# Patient Record
Sex: FEMALE | Race: AMERICAN INDIAN OR ALASKA NATIVE
[De-identification: names, ages, dates, MRNs, and addresses within clinical notes are randomized per-mention and may not be internally consistent; named-entity substitution may affect disease eponyms.]

---

## 2018-05-24 ENCOUNTER — HOSPITAL ENCOUNTER (EMERGENCY)
Dept: HOSPITAL 5 - ED | Age: 28
LOS: 1 days | Discharge: HOME | End: 2018-05-25
Payer: MEDICAID

## 2018-05-24 VITALS — DIASTOLIC BLOOD PRESSURE: 87 MMHG | SYSTOLIC BLOOD PRESSURE: 137 MMHG

## 2018-05-24 DIAGNOSIS — K05.10: ICD-10-CM

## 2018-05-24 DIAGNOSIS — S39.012A: Primary | ICD-10-CM

## 2018-05-24 DIAGNOSIS — K02.9: ICD-10-CM

## 2018-05-24 DIAGNOSIS — E21.3: ICD-10-CM

## 2018-05-24 DIAGNOSIS — I10: ICD-10-CM

## 2018-05-24 PROCEDURE — 81001 URINALYSIS AUTO W/SCOPE: CPT

## 2018-05-24 PROCEDURE — 99282 EMERGENCY DEPT VISIT SF MDM: CPT

## 2018-05-24 PROCEDURE — 96372 THER/PROPH/DIAG INJ SC/IM: CPT

## 2018-05-24 PROCEDURE — 81025 URINE PREGNANCY TEST: CPT

## 2018-05-25 LAB
BACTERIA #/AREA URNS HPF: (no result) /HPF
BILIRUB UR QL STRIP: (no result)
BLOOD UR QL VISUAL: (no result)
MUCOUS THREADS #/AREA URNS HPF: (no result) /HPF
PH UR STRIP: 6 [PH] (ref 5–7)
PROT UR STRIP-MCNC: (no result) MG/DL
RBC #/AREA URNS HPF: 5 /HPF (ref 0–6)
UROBILINOGEN UR-MCNC: < 2 MG/DL (ref ?–2)
WBC #/AREA URNS HPF: 32 /HPF (ref 0–6)

## 2018-05-25 NOTE — EMERGENCY DEPARTMENT REPORT
ED Back Pain/Injury HPI





- General


Chief Complaint: Dental/Oral


Stated Complaint: BACK PAIN, TOOTHACHE


Time Seen by Provider: 18 00:13


Source: patient


Limitations: No Limitations





- History of Present Illness


Initial Comments: 





This is a 27-year-old female nontoxic, well nourished in appearance, no acute 

signs of distress presents to the ED with c/o of acute on chronic lower back 

pain x1 week and left upper toothache x6 months.  Patient stated that the past 

2 days she was moving and developed this pain.  Patient states has history of 

sciatica nerve pain which is similar symptoms as today.  Patient states that 

pain radiates through to his left lower extremity. Patient denies any facial 

swelling.   Patient denies any trauma.  Denies any bladder or bowel 

instability.  Denies any fever, chills, nausea, abdominal pain, vomiting, 

headache, stiff neck, chest pain or shortness of breath.  Patient denies any 

urinary symptoms.  Patient denies any numbness or tingling.  Denies any 

allergies.  PMH includes HTN.


MD Complaint: back pain


-: week(s) (1)


Similar Symptoms Previously: Yes


Place: home


Radiation: left leg


Severity: mild


Severity scale (0 -10): 8


Quality: aching


Consistency: intermittent


Improves With: immobilization, supine, sitting upright


Worsens With: movement


Context: while lifting, turning/twisting


Associated Symptoms: denies other symptoms.  denies: confusion, weakness, chest 

pain, numbness, difficulty walking, cough, difficulty urinating, diaphoresis, 

incontinence, fever/chills, constipation, headaches, abdominal pain, loss of 

appetite, malaise, nausea/vomiting, rash, seizure, shortness of breath, syncope





- Related Data


 Previous Rx's











 Medication  Instructions  Recorded  Last Taken  Type


 


Amoxicillin/K Clav Tab [Augmentin 1 tab PO Q12HR #20 tab 18 Unknown Rx





875 mg]    


 


Cyclobenzaprine [Flexeril] 10 mg PO QHS PRN #7 tablet 18 Unknown Rx


 


Ibuprofen [Motrin] 600 mg PO Q8H PRN #30 tablet 18 Unknown Rx











 Allergies











Allergy/AdvReac Type Severity Reaction Status Date / Time


 


No Known Allergies Allergy   Unverified 16 16:01














ED Review of Systems


ROS: 


Stated complaint: BACK PAIN, TOOTHACHE


Other details as noted in HPI





Constitutional: denies: chills, fever


Eyes: denies: eye pain, eye discharge, vision change


ENT: dental pain.  denies: ear pain, throat pain


Respiratory: denies: cough, shortness of breath, wheezing


Cardiovascular: denies: chest pain, palpitations


Endocrine: no symptoms reported


Gastrointestinal: denies: abdominal pain, nausea, diarrhea


Genitourinary: denies: urgency, dysuria, discharge


Musculoskeletal: back pain.  denies: joint swelling, arthralgia


Skin: denies: rash, lesions


Neurological: denies: headache, weakness, paresthesias


Psychiatric: denies: anxiety, depression


Hematological/Lymphatic: denies: easy bleeding, easy bruising





ED Past Medical Hx





- Past Medical History


Previous Medical History?: Yes


Hx Hypertension: Yes


Hx Congestive Heart Failure: No


Hx Diabetes: No


Hx Deep Vein Thrombosis: No


Hx Renal Disease: Yes (kidney stones)


Hx Seizures: Yes (childhood last age 21)


Hx Asthma: No


Hx COPD: No


Additional medical history: History of cholelithiasis, hyperparathyroidism





- Surgical History


Past Surgical History?: Yes


Additional Surgical History:  





- Social History


Smoking Status: Never Smoker


Substance Use Type: None





- Medications


Home Medications: 


 Home Medications











 Medication  Instructions  Recorded  Confirmed  Last Taken  Type


 


Amoxicillin/K Clav Tab [Augmentin 1 tab PO Q12HR #20 tab 18  Unknown Rx





875 mg]     


 


Cyclobenzaprine [Flexeril] 10 mg PO QHS PRN #7 tablet 18  Unknown Rx


 


Ibuprofen [Motrin] 600 mg PO Q8H PRN #30 tablet 18  Unknown Rx














ED Physical Exam





- General


Limitations: No Limitations


General appearance: alert, in no apparent distress





- Head


Head exam: Present: atraumatic, normocephalic





- Eye


Eye exam: Present: normal appearance





- ENT


ENT exam: Present: mucous membranes moist, TM's normal bilaterally, normal 

external ear exam





- Expanded ENT Exam


  ** Expanded


Ear exam: Present: normal external inspection


Mouth exam: Present: normal external inspection, tongue normal.  Absent: 

drooling, trismus, muffled voice, tongue elevation, laceration


Teeth exam: Present: dental caries, fractured tooth #, dental tenderness #, 

gingival enlargement, other (No facial swelling)





  __________________________














  __________________________





 1 - Fractured, Dental Tenderness





Throat exam: Positive: normal inspection, other (Uvula midline. No abscess.).  

Negative: tonsillar erythema, tonsillomegaly, tonsillar exudate, R 

peritonsillar mass, L peritonsillar mass





- Neck


Neck exam: Present: normal inspection, full ROM.  Absent: tenderness, 

meningismus, lymphadenopathy





- Respiratory


Respiratory exam: Present: normal lung sounds bilaterally.  Absent: respiratory 

distress, wheezes, rales, rhonchi, stridor, chest wall tenderness, accessory 

muscle use, decreased breath sounds, prolonged expiratory





- Cardiovascular


Cardiovascular Exam: Present: regular rate, normal rhythm, normal heart sounds.

  Absent: irregular rhythm, systolic murmur, diastolic murmur, rubs, gallop





- GI/Abdominal


GI/Abdominal exam: Present: soft, normal bowel sounds





- Extremities Exam


Extremities exam: Present: normal inspection, full ROM, normal capillary refill





- Back Exam


Back exam: Present: normal inspection, full ROM, paraspinal tenderness (lumbar 

region).  Absent: tenderness, CVA tenderness (R), CVA tenderness (L), muscle 

spasm, vertebral tenderness, rash noted





- Expanded Back Exam


  ** Expanded


Back exam: Absent: saddle anesthesia


Back exam: Negative Straight Leg Raising: Left, Right





- Neurological Exam


Neurological exam: Present: alert, oriented X3, normal gait





- Psychiatric


Psychiatric exam: Present: normal affect, normal mood





- Skin


Skin exam: Present: warm, dry, intact, normal color.  Absent: rash





ED Course





 Vital Signs











  18





  22:22


 


Temperature 98.1 F


 


Pulse Rate 113 H


 


Respiratory 18





Rate 


 


Blood Pressure 137/87


 


O2 Sat by Pulse 100





Oximetry 














- Reevaluation(s)


Reevaluation #1: 





18 00:45


Patient is speaking in full sentences with no signs of distress noted.





ED Medical Decision Making





- Medical Decision Making





This is a 27-year-old female that presents with low back strain, dental caries, 

and gingivitis.  Patient is stable was examined by me. No facial swelling. No 

abscess.  There is no spinal tenderness.  There is no cauda equina syndrome 

during examination.  No bladder or bowel instability. Patient received Toradol 

60 mg IM in the ED which preceded his symptoms has resolved and subsided.  

Patient is discharged with muscle relaxant and Motrin.  Patient was instructed 

not to operate any machinery while taking muscle relaxant as they cause her 

drowsiness.  Patient was referred to Follow-up with a primary care doctor in 3-

5 days or if symptoms worsen and continue return to emergency room as soon as 

possible.  At time of discharge, the patient does not seem toxic or ill in 

appearance.  No acute signs of distress noted.  Patient agrees to discharge 

treatment plan of care.  No further questions noted by the patient.





This chart is dictated with using Dragon Dictation Program


Critical care attestation.: 


If time is entered above; I have spent that time in minutes in the direct care 

of this critically ill patient, excluding procedure time.








ED Disposition


Clinical Impression: 


 Dental caries, Gingivitis





Low back strain


Qualifiers:


 Encounter type: initial encounter Qualified Code(s): S39.012A - Strain of 

muscle, fascia and tendon of lower back, initial encounter





Disposition:  TO HOME OR SELFCARE


Is pt being admited?: No


Does the pt Need Aspirin: No


Condition: Stable


Instructions:  Gingivitis (ED), Dental Caries (ED), Low Back Strain (ED), 

Cyclobenzaprine (By mouth), Ibuprofen (By mouth), Amoxicillin/Clavulanate 

Potassium (By mouth)


Additional Instructions: 


Follow-up with a primary care and dentist doctor in 3-5 days or if symptoms 

worsen and continue return to emergency room as soon as possible. 


Prescriptions: 


Cyclobenzaprine [Flexeril] 10 mg PO QHS PRN #7 tablet


 PRN Reason: Muscle Spasm


Amoxicillin/K Clav Tab [Augmentin 875 mg] 1 tab PO Q12HR #20 tab


Ibuprofen [Motrin] 600 mg PO Q8H PRN #30 tablet


 PRN Reason: Pain


Referrals: 


PRIMARY CARE,MD [Primary Care Provider] - 3-5 Days


JAYDEN MEADE MD [Staff Physician] - 3-5 Days


[facility] [Outside] - 3-5 Days


[facility] [Outside] - 3-5 Days


Forms:  Work/School Release Form(ED)

## 2018-09-22 ENCOUNTER — HOSPITAL ENCOUNTER (EMERGENCY)
Dept: HOSPITAL 5 - ED | Age: 28
Discharge: HOME | End: 2018-09-22
Payer: MEDICAID

## 2018-09-22 VITALS — DIASTOLIC BLOOD PRESSURE: 103 MMHG | SYSTOLIC BLOOD PRESSURE: 149 MMHG

## 2018-09-22 DIAGNOSIS — E21.3: ICD-10-CM

## 2018-09-22 DIAGNOSIS — I10: ICD-10-CM

## 2018-09-22 DIAGNOSIS — Z87.891: ICD-10-CM

## 2018-09-22 DIAGNOSIS — L05.01: Primary | ICD-10-CM

## 2018-09-22 LAB
BACTERIA #/AREA URNS HPF: (no result) /HPF
BASOPHILS # (AUTO): 0 K/MM3 (ref 0–0.1)
BASOPHILS NFR BLD AUTO: 0.4 % (ref 0–1.8)
BILIRUB UR QL STRIP: (no result)
BLOOD UR QL VISUAL: (no result)
BUN SERPL-MCNC: 14 MG/DL (ref 7–17)
BUN/CREAT SERPL: 14 %
CALCIUM SERPL-MCNC: 9.1 MG/DL (ref 8.4–10.2)
EOSINOPHIL # BLD AUTO: 0 K/MM3 (ref 0–0.4)
EOSINOPHIL NFR BLD AUTO: 0.6 % (ref 0–4.3)
HCT VFR BLD CALC: 35.7 % (ref 30.3–42.9)
HEMOLYSIS INDEX: 0
HGB BLD-MCNC: 11.7 GM/DL (ref 10.1–14.3)
LYMPHOCYTES # BLD AUTO: 1.9 K/MM3 (ref 1.2–5.4)
LYMPHOCYTES NFR BLD AUTO: 25.3 % (ref 13.4–35)
MCH RBC QN AUTO: 27 PG (ref 28–32)
MCHC RBC AUTO-ENTMCNC: 33 % (ref 30–34)
MCV RBC AUTO: 82 FL (ref 79–97)
MONOCYTES # (AUTO): 0.6 K/MM3 (ref 0–0.8)
MONOCYTES % (AUTO): 8 % (ref 0–7.3)
MUCOUS THREADS #/AREA URNS HPF: (no result) /HPF
PH UR STRIP: 5 [PH] (ref 5–7)
PLATELET # BLD: 221 K/MM3 (ref 140–440)
RBC # BLD AUTO: 4.35 M/MM3 (ref 3.65–5.03)
RBC #/AREA URNS HPF: 2 /HPF (ref 0–6)
UROBILINOGEN UR-MCNC: < 2 MG/DL (ref ?–2)
WBC #/AREA URNS HPF: 4 /HPF (ref 0–6)

## 2018-09-22 PROCEDURE — 80048 BASIC METABOLIC PNL TOTAL CA: CPT

## 2018-09-22 PROCEDURE — 81025 URINE PREGNANCY TEST: CPT

## 2018-09-22 PROCEDURE — 81001 URINALYSIS AUTO W/SCOPE: CPT

## 2018-09-22 PROCEDURE — 36415 COLL VENOUS BLD VENIPUNCTURE: CPT

## 2018-09-22 PROCEDURE — 85025 COMPLETE CBC W/AUTO DIFF WBC: CPT

## 2018-09-22 NOTE — EMERGENCY DEPARTMENT REPORT
Abscess Boil HPI





- HPI


Chief Complaint: Rectal Pain


Stated Complaint: CYST ON TALE BONE/FEVER PAIN


Time Seen by Provider: 18 09:14


Duration: 3 Days


Location: Sacral/Pilonidal


Severity: Moderate


History: Yes Pain, No Fever, No Purulent Drainage, No Numbness, No Foreign Body

, No Previous History, No Insect Bite


Home Medications: 


 Previous Rx's











 Medication  Instructions  Recorded  Last Taken  Type


 


Ibuprofen [Motrin] 600 mg PO Q8H PRN #30 tablet 18 Unknown Rx


 


cephALEXin [Keflex] 500 mg PO Q12HR #20 cap 18 Unknown Rx


 


traMADol [Ultram] 50 mg PO Q6HR PRN #12 tablet 18 Unknown Rx











Allergies/Adverse Reactions: 


 Allergies











Allergy/AdvReac Type Severity Reaction Status Date / Time


 


No Known Allergies Allergy   Unverified 16 16:01














ED Review of Systems


ROS: 


Stated complaint: CYST ON TALE BONE/FEVER PAIN


Other details as noted in HPI





Comment: All other systems reviewed and negative


Constitutional: denies: chills, fever


Respiratory: no symptoms reported


Cardiovascular: denies: chest pain, palpitations


Genitourinary: denies: urgency, dysuria


Musculoskeletal: denies: back pain


Skin: lesions, other (pilonila cyst)


Neurological: denies: headache, weakness


Psychiatric: denies: anxiety, depression


Hematological/Lymphatic: denies: easy bleeding





ED Past Medical Hx





- Past Medical History


Hx Hypertension: Yes


Hx Congestive Heart Failure: No


Hx Diabetes: No


Hx Deep Vein Thrombosis: No


Hx Renal Disease: Yes (kidney stones)


Hx Seizures: Yes (childhood last age 21)


Hx Asthma: No


Hx COPD: No


Additional medical history: History of cholelithiasis, hyperparathyroidism





- Surgical History


Additional Surgical History:  





- Social History


Smoking Status: Former Smoker





- Medications


Home Medications: 


 Home Medications











 Medication  Instructions  Recorded  Confirmed  Last Taken  Type


 


Ibuprofen [Motrin] 600 mg PO Q8H PRN #30 tablet 18  Unknown Rx


 


cephALEXin [Keflex] 500 mg PO Q12HR #20 cap 18  Unknown Rx


 


traMADol [Ultram] 50 mg PO Q6HR PRN #12 tablet 18  Unknown Rx














ED Abscess Boil Physical Exam





- Exam


General: 


Vital signs noted. No distress. Alert and acting appropriately.





Front/Back of Body, Lg (Color): 


  __________________________














  __________________________





 1 - area of cyst





Size: 4 cm


Exam: Yes Tenderness, Yes Fluctuance, Yes Surrounding Cellulites/Erythema, Yes 

Normal Neurologic Exam, Yes Normal Circulation, No Lymphangitis, No Crepitation

, No Heart Murmur





I & D Note





- I & D Note


I & D Note: 2% lido 5 ml.  area cleaned with 1/2 ns and 1/2 betadine.  area 

numbed and cleaned.  no 10 blade to open 1/4 inch area.  expressed mod amount 

purulent drainage.  tracking across midline noted.  loculations opened.  

packing w iodoform guaze.  dressing applied.  dc instructions reviewed.  pt 

tolerated well





ED Course


 Vital Signs











  18





  08:48


 


Temperature 97.5 F L


 


Pulse Rate 88


 


Respiratory 18





Rate 


 


Blood Pressure 149/103














- Reevaluation(s)


Reevaluation #1: 





18 11:04


medicated





labs noted





Reevaluation #2: 





18 11:04


i/d performed tolerated well


Critical care attestation.: 


If time is entered above; I have spent that time in minutes in the direct care 

of this critically ill patient, excluding procedure time.








ED Medical Decision Making





- Lab Data


Result diagrams: 


 18 09:35





 18 09:35





- Medical Decision Making





non toxic


i/d


packed


antibiotics given area





- Differential Diagnosis


pilon. cyst v simple abscess





ED Disposition


Clinical Impression: 


 Pilonidal abscess





Disposition: DC-01 TO HOME OR SELFCARE


Is pt being admited?: No


Does the pt Need Aspirin: No


Condition: Stable


Instructions:  Anorectal Abscess and Anal Fistula (ED)


Additional Instructions: 


soak in epsom salts 3 times per day for 20 minutes





apply guaze dressing





meds as ordered





call pcp on monday for gen surgery follow up





do not be alarmed if packing falls out





tell pcp the packing is in and she may want to see you








Prescriptions: 


cephALEXin [Keflex] 500 mg PO Q12HR #20 cap


traMADol [Ultram] 50 mg PO Q6HR PRN #12 tablet


 PRN Reason: Pain


Referrals: 


PRIMARY CARE,MD [Primary Care Provider] - 3-5 Days


Time of Disposition: 11:00

## 2019-02-25 ENCOUNTER — HOSPITAL ENCOUNTER (EMERGENCY)
Dept: HOSPITAL 5 - ED | Age: 29
LOS: 1 days | Discharge: HOME | End: 2019-02-26
Payer: MEDICAID

## 2019-02-25 DIAGNOSIS — B34.9: Primary | ICD-10-CM

## 2019-02-25 DIAGNOSIS — I10: ICD-10-CM

## 2019-02-25 PROCEDURE — 71046 X-RAY EXAM CHEST 2 VIEWS: CPT

## 2019-02-25 NOTE — XRAY REPORT
FINAL REPORT



PROCEDURE:  XR CHEST ROUTINE 2V



TECHNIQUE:  PA and lateral chest radiographs were obtained. CPT 70978







HISTORY:  cough 



COMPARISON:  No prior studies are available for comparison.



FINDINGS:  

Heart: Normal.



Mediastinum/Vessels: Normal.



Lungs/Pleural space: Normal.



Bony thorax: Mild degree dextroscoliosis is noted.



Other: 



IMPRESSION:  

Normal examination.

## 2019-02-25 NOTE — EMERGENCY DEPARTMENT REPORT
Blank Doc





- Documentation


Documentation: 





This is a 28-year-old female that presents with URI symptoms and a cyst in the 

back.





This initial assessment diagnostic orders/clinical plan/treatment(s) is/are 

subject to change based on patient's health status, clinical progression and re-

assessment by fellow clinical providers in the ED.  Further treatment and workup

at subsequent clinical providers discretion.  Patient/guardians urged not to 

elope from ED s their condition may be serious if not clinically assessed and 

managed.  Initial orders include:


1-Patient sent to [facility] for further evaluation and treatment


2- CXR

## 2019-02-25 NOTE — EMERGENCY DEPARTMENT REPORT
- General


Chief Complaint: Upper Respiratory Infection


Stated Complaint: COUGH/BODY PAIN/CYST


Time Seen by Provider: 19 18:22


Source: patient


Mode of arrival: Ambulatory


Limitations: No Limitations





- History of Present Illness


Initial Comments: 





28-year-old -American female presents to the emergency room for flulike 

symptoms.  Patient reports that she also has mild tenderness and firmness to her

gluteal cleft where she had a cyst drained in the past.  She denies any fever 

but does complain of cough runny nose and nasal congestion.  Denies any fever no

chills no nausea no vomiting or abdominal pain.


MD Complaint: cough, sore throat, rhinorrhea, nasal congestion, other (this on 

gluteal cleft)


-: days(s) (1)


Improves With: nothing


Associated Symptoms: myalgias, rhinorrhea, nasal congestion, cough.  denies: 

fever, chills, headache, nausea, vomiting, diarrhea


Treatments Prior to Arrival: none





- Related Data


                                  Previous Rx's











 Medication  Instructions  Recorded  Last Taken  Type


 


cephALEXin [Keflex] 500 mg PO Q12HR #20 cap 18 Unknown Rx


 


Benzonatate [Tessalon Perle] 100 mg PO TID PRN 7 Days #21 19 Unknown Rx





 capsule   


 


Ibuprofen [Motrin 600 MG tab] 600 mg PO Q8H PRN #30 tablet 19 Unknown Rx


 


Oseltamivir Phosphate [Tamiflu] 75 mg PO BID 5 Days #10 capsule 19 Unknown

 Rx


 


Sulfamethoxazole/Trimethoprim 1 each PO BID #20 tablet 19 Unknown Rx





[Bactrim DS TAB]    


 


traMADol [Ultram 50 MG tab] 50 mg PO Q6HR PRN #12 tablet 19 Unknown Rx











                                    Allergies











Allergy/AdvReac Type Severity Reaction Status Date / Time


 


No Known Allergies Allergy   Unverified 16 16:01














ED Review of Systems


ROS: 


Stated complaint: COUGH/BODY PAIN/CYST


Other details as noted in HPI





Comment: All other systems reviewed and negative


Constitutional: denies: chills, fever


Eyes: denies: eye pain, eye discharge, vision change


ENT: throat pain, congestion.  denies: ear pain


Respiratory: cough


Cardiovascular: denies: chest pain, palpitations


Endocrine: no symptoms reported


Gastrointestinal: denies: abdominal pain, nausea, diarrhea


Genitourinary: denies: urgency, dysuria, discharge


Musculoskeletal: denies: back pain, joint swelling, arthralgia


Skin: denies: rash, lesions


Neurological: denies: headache, weakness, paresthesias





ED Past Medical Hx





- Past Medical History


Hx Hypertension: Yes


Hx Congestive Heart Failure: No


Hx Diabetes: No


Hx Deep Vein Thrombosis: No


Hx Renal Disease: Yes (kidney stones)


Hx Seizures: Yes (childhood last age 21)


Hx Asthma: No


Hx COPD: No


Additional medical history: History of cholelithiasis, hyperparathyroidism





- Surgical History


Additional Surgical History:  , T&A,thyroid





- Social History


Smoking Status: Never Smoker


Substance Use Type: None





- Medications


Home Medications: 


                                Home Medications











 Medication  Instructions  Recorded  Confirmed  Last Taken  Type


 


cephALEXin [Keflex] 500 mg PO Q12HR #20 cap 18  Unknown Rx


 


Benzonatate [Tessalon Perle] 100 mg PO TID PRN 7 Days #21 19  Unknown Rx





 capsule    


 


Ibuprofen [Motrin 600 MG tab] 600 mg PO Q8H PRN #30 tablet 19  Unknown Rx


 


Oseltamivir Phosphate [Tamiflu] 75 mg PO BID 5 Days #10 capsule 19  

Unknown Rx


 


Sulfamethoxazole/Trimethoprim 1 each PO BID #20 tablet 19  Unknown Rx





[Bactrim DS TAB]     


 


traMADol [Ultram 50 MG tab] 50 mg PO Q6HR PRN #12 tablet 19  Unknown Rx














ED Physical Exam





- General


Limitations: No Limitations


General appearance: alert, in no apparent distress





- Head


Head exam: Present: atraumatic, normocephalic





- Eye


Eye exam: Present: EOMI





- ENT


ENT exam: Present: mucous membranes moist





- Neck


Neck exam: Present: normal inspection, full ROM.  Absent: tenderness, 

lymphadenopathy





- Respiratory


Respiratory exam: Present: normal lung sounds bilaterally.  Absent: respiratory 

distress





- Cardiovascular


Cardiovascular Exam: Present: regular rate, normal rhythm.  Absent: systolic 

murmur, diastolic murmur, rubs, gallop





- GI/Abdominal


GI/Abdominal exam: Present: soft, normal bowel sounds





- Back Exam


Back exam: Present: normal inspection





- Neurological Exam


Neurological exam: Present: alert, oriented X3





- Psychiatric


Psychiatric exam: Present: normal affect, normal mood





- Expanded Skin Exam


  ** Expanded


Description of rash: Present: tenderness, indurated.  Absent: erythematous, swel

ling, discharge, fluctuant





ED Course





                                   Vital Signs











  02/25/19





  18:25


 


Temperature 98.4 F


 


Pulse Rate 99 H


 


Respiratory 16





Rate 


 


Blood Pressure 146/98


 


O2 Sat by Pulse 99





Oximetry 














ED Medical Decision Making





- Medical Decision Making





Patient has been evaluated by this provider in the Acc.


Discussed with patient place her on antibiotics or her cysts.


Patient was started on Tamiflu for flulike symptoms, Tessalon Perles for cough 

and ibuprofen for body aches and cyst pain.


Discussed patient to use warm compresses to this and to return if it increases 

in pain diameter and intensity.


Patient verbalizes understanding





Critical care attestation.: 


If time is entered above; I have spent that time in minutes in the direct care 

of this critically ill patient, excluding procedure time.








ED Disposition


Clinical Impression: 


 Viral syndrome, H/O pilonidal cyst





Disposition: - TO HOME OR SELFCARE


Is pt being admited?: No


Does the pt Need Aspirin: No


Condition: Stable


Instructions:  Viral Syndrome (ED)


Additional Instructions: 


Complete medication as prescribed.  Increase her water intake.  Follow-up with 

your primary care provider if her symptoms persist or gets worse.


Prescriptions: 


Benzonatate [Tessalon Perle] 100 mg PO TID PRN 7 Days #21 capsule


 PRN Reason: Cough


Ibuprofen [Motrin 600 MG tab] 600 mg PO Q8H PRN #30 tablet


 PRN Reason: Pain


Oseltamivir Phosphate [Tamiflu] 75 mg PO BID 5 Days #10 capsule


Sulfamethoxazole/Trimethoprim [Bactrim DS TAB] 1 each PO BID #20 tablet


traMADol [Ultram 50 MG tab] 50 mg PO Q6HR PRN #12 tablet


 PRN Reason: Pain


Referrals: 


ELOY CROUCH MD [Primary Care Provider] - 3-5 Days


Forms:  Work/School Release Form(ED)

## 2019-02-26 VITALS — SYSTOLIC BLOOD PRESSURE: 140 MMHG | DIASTOLIC BLOOD PRESSURE: 102 MMHG

## 2021-02-17 ENCOUNTER — OFFICE VISIT (OUTPATIENT)
Dept: URBAN - METROPOLITAN AREA CLINIC 25 | Facility: CLINIC | Age: 31
End: 2021-02-17

## 2021-03-04 ENCOUNTER — OFFICE VISIT (OUTPATIENT)
Dept: URBAN - METROPOLITAN AREA CLINIC 25 | Facility: CLINIC | Age: 31
End: 2021-03-04

## 2021-09-26 ENCOUNTER — HOSPITAL ENCOUNTER (EMERGENCY)
Dept: HOSPITAL 5 - ED | Age: 31
LOS: 1 days | Discharge: HOME | End: 2021-09-27
Payer: COMMERCIAL

## 2021-09-26 DIAGNOSIS — L03.317: ICD-10-CM

## 2021-09-26 DIAGNOSIS — L02.31: Primary | ICD-10-CM

## 2021-09-26 DIAGNOSIS — L05.01: ICD-10-CM

## 2021-09-26 DIAGNOSIS — I10: ICD-10-CM

## 2021-09-26 PROCEDURE — 99282 EMERGENCY DEPT VISIT SF MDM: CPT

## 2021-09-27 VITALS — DIASTOLIC BLOOD PRESSURE: 102 MMHG | SYSTOLIC BLOOD PRESSURE: 147 MMHG

## 2021-09-27 NOTE — EMERGENCY DEPARTMENT REPORT
ED General Adult HPI





- General


Chief complaint: Abdominal Pain


Stated complaint: ABD PAIN


Source: patient


Mode of arrival: Ambulatory


Limitations: No Limitations





- History of Present Illness


Initial comments: 





Patient is a 31-year-old -American female with past medical history of 

kidney stones, hypertension, hyperparathyroidism and seizures who presents to 

the ED with complaint of acute onset persistent severe painful swollen mild 

erythematous maculopapular rash on left gluteal cleft for the last 1 week.  

Patient states that the pain has worsened especially in the last 3 days.  

Patient states that tonight she was not able to sleep because of worsening pain.

 Patient also complains of painful swollen left inguinal lymph nodes for the 

last 2 days.  Patient denies abdominal pain, nausea, vomiting, fever, chills, 

cough, chest pain, traumatic injury, numbness and tingling or weakness of lower 

extremities bilaterally, bowel incontinence, low back pain, urinary retention or

saddle paresthesia.


MD Complaint: Painful swollen erythematous maculopapular rash on left gluteal 

cleft


-: Sudden, week(s) (1)


Location: buttocks (LEFT)


Radiation: non-radiation


Severity scale (0 -10): 9


Quality: aching, sharp, constant


Consistency: constant


Improves with: none


Worsens with: movement


Associated Symptoms: denies other symptoms, loss of appetite, malaise, rash 

(Painful, swollen, mildly erythematous maculopapular rash on left gluteal 

cleft).  denies: confusion, chest pain, cough, diaphoresis, fever/chills, 

headaches, nausea/vomiting, seizure, shortness of breath, syncope, weakness


Treatments Prior to Arrival: NSAID





- Related Data


                                  Previous Rx's











 Medication  Instructions  Recorded  Last Taken  Type


 


cephALEXin [Keflex] 500 mg PO Q12HR #20 cap 18 Unknown Rx


 


Benzonatate [Tessalon Perle] 100 mg PO TID PRN 7 Days #21 19 Unknown Rx





 capsule   


 


Ibuprofen [Motrin 600 MG tab] 600 mg PO Q8H PRN #30 tablet 19 Unknown Rx


 


Oseltamivir Phosphate [Tamiflu] 75 mg PO BID 5 Days #10 capsule 19 Unknown

 Rx


 


traMADoL [Ultram 50 MG tab] 50 mg PO Q6HR PRN #12 tablet 19 Unknown Rx


 


Cyclobenzaprine [Flexeril] 10 mg PO TID PRN #30 tablet 19 Unknown Rx


 


Diclofenac  (Nf) 50 mg PO TID PRN #30 tablet. 19 Unknown Rx


 


Menthol/Camphor [Tiger Balm 1 applicatio TP TID PRN #1 tube 19 Unknown Rx





Ointment]    


 


predniSONE [Deltasone] 40 mg PO QDAY 5 Days #10 tab 19 Unknown Rx


 


Acetaminophen/Codeine [Tylenol 1 tab PO Q6H PRN #12 tab 21 Unknown Rx





/Codeine # 3 tab]    


 


Ibuprofen [Motrin] 800 mg PO Q8HR PRN #30 tablet 21 Unknown Rx


 


Sulfamethoxazole/Trimethoprim 1 each PO Q12H #20 tablet 21 Unknown Rx





[Bactrim DS TAB]    











                                    Allergies











Allergy/AdvReac Type Severity Reaction Status Date / Time


 


No Known Allergies Allergy   Verified 19 19:41














ED Review of Systems


ROS: 


Stated complaint: ABD PAIN


Other details as noted in HPI





Constitutional: denies: chills, fever


Eyes: denies: eye pain, eye discharge, vision change


ENT: denies: ear pain, throat pain


Respiratory: denies: cough, shortness of breath, wheezing


Cardiovascular: denies: chest pain, palpitations


Endocrine: no symptoms reported


Gastrointestinal: denies: abdominal pain, nausea, diarrhea


Genitourinary: denies: urgency, dysuria, discharge


Musculoskeletal: denies: back pain, joint swelling, arthralgia


Skin: rash (Painful, swollen, mildly erythematous maculopapular rash on left 

gluteal cleft), change in color.  denies: lesions


Neurological: denies: headache, weakness, paresthesias


Psychiatric: denies: anxiety, depression


Hematological/Lymphatic: denies: easy bleeding, easy bruising





ED Past Medical Hx





- Past Medical History


Hx Hypertension: Yes


Hx Congestive Heart Failure: No


Hx Diabetes: No


Hx Deep Vein Thrombosis: No


Hx Renal Disease: Yes (kidney stones)


Hx Seizures: Yes (childhood last age 21)


Hx Asthma: No


Hx COPD: No


Additional medical history: History of cholelithiasis, hyperparathyroidism





- Surgical History


Additional Surgical History:  , T&A,thyroid





- Social History


Smoking Status: Never Smoker


Substance Use Type: None





- Medications


Home Medications: 


                                Home Medications











 Medication  Instructions  Recorded  Confirmed  Last Taken  Type


 


cephALEXin [Keflex] 500 mg PO Q12HR #20 cap 18  Unknown Rx


 


Benzonatate [Tessalon Perle] 100 mg PO TID PRN 7 Days #21 19  Unknown Rx





 capsule    


 


Ibuprofen [Motrin 600 MG tab] 600 mg PO Q8H PRN #30 tablet 19  Unknown Rx


 


Oseltamivir Phosphate [Tamiflu] 75 mg PO BID 5 Days #10 capsule 19  

Unknown Rx


 


traMADoL [Ultram 50 MG tab] 50 mg PO Q6HR PRN #12 tablet 19  Unknown Rx


 


Cyclobenzaprine [Flexeril] 10 mg PO TID PRN #30 tablet 19  Unknown Rx


 


Diclofenac Dr (Nf) 50 mg PO TID PRN #30 tablet.dr 19  Unknown Rx


 


Menthol/Camphor [Tiger Balm 1 applicatio TP TID PRN #1 tube 19  Unknown Rx





Ointment]     


 


predniSONE [Deltasone] 40 mg PO QDAY 5 Days #10 tab 19  Unknown Rx


 


Acetaminophen/Codeine [Tylenol 1 tab PO Q6H PRN #12 tab 21  Unknown Rx





/Codeine # 3 tab]     


 


Ibuprofen [Motrin] 800 mg PO Q8HR PRN #30 tablet 21  Unknown Rx


 


Sulfamethoxazole/Trimethoprim 1 each PO Q12H #20 tablet 21  Unknown Rx





[Bactrim DS TAB]     














ED Physical Exam





- General


Limitations: No Limitations


General appearance: alert, in no apparent distress





- Head


Head exam: Present: atraumatic, normocephalic, normal inspection





- Eye


Eye exam: Present: normal appearance, PERRL, EOMI


Pupils: Present: normal accommodation





- ENT


ENT exam: Present: normal exam, normal orophraynx, mucous membranes moist, TM's 

normal bilaterally, normal external ear exam





- Neck


Neck exam: Present: normal inspection, full ROM





- Respiratory


Respiratory exam: Present: normal lung sounds bilaterally.  Absent: respiratory 

distress, wheezes, rales, stridor, chest wall tenderness, accessory muscle use, 

decreased breath sounds, prolonged expiratory





- Cardiovascular


Cardiovascular Exam: Present: normal rhythm, tachycardia, normal heart sounds.  

Absent: systolic murmur, diastolic murmur, rubs, gallop





- GI/Abdominal


GI/Abdominal exam: Present: soft, normal bowel sounds.  Absent: tenderness, 

guarding, rebound, hyperactive bowel sounds, hypoactive bowel sounds, 

organomegaly, mass, pulsatile mass, hernia





- Extremities Exam


Extremities exam: Present: normal inspection, full ROM, normal capillary refill





- Back Exam


Back exam: Present: normal inspection, full ROM.  Absent: tenderness, CVA 

tenderness (R), CVA tenderness (L), muscle spasm, paraspinal tenderness





- Neurological Exam


Neurological exam: Present: alert, oriented X3, CN II-XII intact, normal gait, 

reflexes normal





- Psychiatric


Psychiatric exam: Present: normal affect, normal mood





- Skin


Skin exam: Present: warm, dry, intact, normal color, rash (Swollen, severely 

tender, fluctuant maculopapular erythematous rash on left gluteal cleft), er

ythema, other (Female RN chaperone Ms. Williamson present during the physical exam.)





ED Course


                                   Vital Signs











  21





  01:27


 


Temperature 98.9 F


 


Pulse Rate 114 H


 


Respiratory 18





Rate 


 


Blood Pressure 147/102


 


O2 Sat by Pulse 100





Oximetry 














- I & D


  ** Left Buttocks


Type of Procedure: Simple


Site: Left gluteal cleft


Blade Size: 11


I & D Procedure: betadine prep, sterile drapes applied, sterile dressing 

applied, gauze wick placed


Progress: 





The left gluteal cleft was cleaned thoroughly with normal saline and Betadine 

solution.  Lidocaine 1% solution was infiltrated around the rash for local 

anesthesia.  When anesthesia was fully achieved, an incision was made on the 

rash and copious thick purulent malodorous discharge drained from the wound.  

The wound was then debrided extensively with normal saline and flocculation's 

were broken with hemostat.  The wound was then packed with iodoform quarter inch

gauze and dressed appropriately with 4 x 4 gauzes and Tegaderm.  Patient 

tolerated the procedure well.  Patient was theN discharged home on pain 

medications and antibiotics and advised to follow-up with her primary care 

physician in 7 to 10 days for reevaluation or return to the ED immediately if 

symptoms get worse.  Patient was also advised to return to the ED in 2 days for 

wound recheck and packing removal.





ED Medical Decision Making





- Medical Decision Making





This is a 31-year-old -American female with past medical history of 

kidney stones, hypertension, hyperparathyroidism and seizures who presents to 

the ED with complaint of acute onset persistent severe painful swollen mild 

erythematous maculopapular rash on left gluteal cleft for the last 1 week.  

Patient states that the pain has worsened especially in the last 3 days.  

Patient states that tonight she was not able to sleep because of worsening pain.

 Patient also complains of painful swollen left inguinal lymph nodes for the 

last 2 days.  In the ED, patient is alert and oriented x3 and is not in any 

distress but appears to be in significant pain crying during the physical exam. 

Patient was treated for pain in the ED and also given initial oral antibiotics. 

The left gluteal abscess was incised and drained per protocol after the area was

extensively cleaned with Betadine and normal saline and 1% lidocaine solution 

was used as a local anesthetic.  The wound was then incised with scalpel blade 

#11 and copious thick purulent greenish malodorous discharge drained from the 

wound.  The wound was extensively debrided with normal saline and for locu

lations were broken with hemostat.  The wound was then packed with iodoform 

quarter inch gauze and dressed appropriately with 4 x 4 gauze and Tegaderm.  

Patient tolerated the procedure well.  On reevaluation, patient is alert and 

oriented x3, is hemodynamically stable, the patient pain is well controlled with

medication, and patient is fully ambulatory in the ED with no difficulties.  

Patient was discharged home and advised to return to the ED in 2 days for 

recheck or packing removal.  Patient was otherwise advised to follow-up with her

primary care physician in 7 to 10 days for reevaluation or return to the ED 

immediately if symptoms get worse.





- Differential Diagnosis


Cutaneous abscess; cellulitis; folliculitis; pilonidal cyst abscess


Critical care attestation.: 


If time is entered above; I have spent that time in minutes in the direct care 

of this critically ill patient, excluding procedure time.








ED Disposition


Clinical Impression: 


 Abscess and cellulitis of gluteal region, Sacrococcygeal pilonidal cyst with 

abscess





Disposition:  HOME / SELF CARE / HOMELESS


Is pt being admited?: No


Does the pt Need Aspirin: No


Condition: Stable


Instructions:  Abdominal Pain (ED), Incision and Drainage, Care After, 

Cellulitis, Adult, Easy-to-Read, Skin Abscess, Easy-to-Read, Pilonidal Cyst 

Drainage


Additional Instructions: 


Take medication with food, drink plenty of fluids and follow-up with your 

primary care physician in 7 to 10 days for reevaluation.  Return to the ED 

immediately if symptoms get worse.  Otherwise return to the ED in 2 days for 

wound recheck and packing removal.


Prescriptions: 


Sulfamethoxazole/Trimethoprim [Bactrim DS TAB] 1 each PO Q12H #20 tablet


Ibuprofen [Motrin] 800 mg PO Q8HR PRN #30 tablet


 PRN Reason: Pain , Severe (7-10)


Acetaminophen/Codeine [Tylenol /Codeine # 3 tab] 1 tab PO Q6H PRN #12 tab


 PRN Reason: Pain , Severe (7-10)


Referrals: 


[facility] [Provider Group] - 7-10 days


Forms:  Work/School Release Form(ED)


Time of Disposition: 05:14


Print Language: ENGLISH

## 2021-09-28 ENCOUNTER — HOSPITAL ENCOUNTER (EMERGENCY)
Dept: HOSPITAL 5 - ED | Age: 31
LOS: 1 days | Discharge: HOME | End: 2021-09-29
Payer: COMMERCIAL

## 2021-09-28 DIAGNOSIS — K80.20: ICD-10-CM

## 2021-09-28 DIAGNOSIS — Z48.01: Primary | ICD-10-CM

## 2021-09-28 DIAGNOSIS — L02.31: ICD-10-CM

## 2021-09-28 DIAGNOSIS — E21.3: ICD-10-CM

## 2021-09-28 DIAGNOSIS — Z98.890: ICD-10-CM

## 2021-09-28 DIAGNOSIS — L03.317: ICD-10-CM

## 2021-09-28 DIAGNOSIS — G43.909: ICD-10-CM

## 2021-09-28 DIAGNOSIS — N18.9: ICD-10-CM

## 2021-09-28 DIAGNOSIS — I12.9: ICD-10-CM

## 2021-09-28 DIAGNOSIS — N20.0: ICD-10-CM

## 2021-09-28 PROCEDURE — 99282 EMERGENCY DEPT VISIT SF MDM: CPT

## 2021-09-29 VITALS — SYSTOLIC BLOOD PRESSURE: 149 MMHG | DIASTOLIC BLOOD PRESSURE: 95 MMHG

## 2021-09-29 NOTE — EMERGENCY DEPARTMENT REPORT
ED General Adult HPI





- General


Chief complaint: Laceration/Recheck/Suture


Stated complaint: PACKAGING REMOVAL


Time Seen by Provider: 21 00:38


Source: patient


Mode of arrival: Ambulatory


Limitations: No Limitations





- History of Present Illness


Initial comments: 


31-year-old female patient presents to the emergency department for packing 

removal.  Patient states she underwent incision and drainage of a gluteal 

abscess 2 days ago.  She has been taking antibiotics and pain medication as 

prescribed.  States her pain is not adequately controlled on her current 

medication regimen.  She is not currently scheduled for outpatient follow-up.  

Patient has suffered from multiple abscesses in the gluteal area.  She was 

evaluated by general surgery on a prior occasion but no operation was performed.

 Patient is otherwise asymptomatic without complaints.





- Related Data


                                  Previous Rx's











 Medication  Instructions  Recorded  Last Taken  Type


 


cephALEXin [Keflex] 500 mg PO Q12HR #20 cap 18 Unknown Rx


 


Benzonatate [Tessalon Perle] 100 mg PO TID PRN 7 Days #21 19 Unknown Rx





 capsule   


 


Ibuprofen [Motrin 600 MG tab] 600 mg PO Q8H PRN #30 tablet 19 Unknown Rx


 


Oseltamivir Phosphate [Tamiflu] 75 mg PO BID 5 Days #10 capsule 19 Unknown

 Rx


 


traMADoL [Ultram 50 MG tab] 50 mg PO Q6HR PRN #12 tablet 19 Unknown Rx


 


Cyclobenzaprine [Flexeril] 10 mg PO TID PRN #30 tablet 19 Unknown Rx


 


Diclofenac Dr (Nf) 50 mg PO TID PRN #30 tablet.dr 19 Unknown Rx


 


Menthol/Camphor [Tiger Balm 1 applicatio TP TID PRN #1 tube 19 Unknown Rx





Ointment]    


 


predniSONE [Deltasone] 40 mg PO QDAY 5 Days #10 tab 19 Unknown Rx


 


Acetaminophen/Codeine [Tylenol 1 tab PO Q6H PRN #12 tab 21 Unknown Rx





/Codeine # 3 tab]    


 


Ibuprofen [Motrin] 800 mg PO Q8HR PRN #30 tablet 21 Unknown Rx


 


Sulfamethoxazole/Trimethoprim 1 each PO Q12H #20 tablet 21 Unknown Rx





[Bactrim DS TAB]    


 


traMADoL [Ultram 50 MG tab] 50 mg PO Q6HR PRN #10 tablet 21 Unknown Rx











                                    Allergies











Allergy/AdvReac Type Severity Reaction Status Date / Time


 


No Known Allergies Allergy   Verified 21 00:36














ED Review of Systems


ROS: 


Stated complaint: PACKAGING REMOVAL


Other details as noted in HPI





Other: 





GENERAL: Negative for fever. 


CARDIOVASCULAR: Negative for chest pain. 


PULMONARY: Negative for shortness of breath. 


GASTROINTESTINAL: Negative for abdominal pain. 


MUSCULOSKELETAL: Negative for back pain. 


NEUROLOGICAL: Negative for headache. 


INTEGUMENTARY: Positive for abscess.





ED Past Medical Hx





- Past Medical History


Hx Hypertension: Yes


Hx Congestive Heart Failure: No


Hx Diabetes: No


Hx Deep Vein Thrombosis: No


Hx Renal Disease: Yes (kidney stones)


Hx Seizures: Yes (childhood last age 21)


Hx Asthma: No


Hx COPD: No


Additional medical history: History of cholelithiasis, hyperparathyroidism





- Surgical History


Additional Surgical History:  , T&A,thyroid





- Social History


Smoking Status: Never Smoker


Substance Use Type: None





- Medications


Home Medications: 


                                Home Medications











 Medication  Instructions  Recorded  Confirmed  Last Taken  Type


 


cephALEXin [Keflex] 500 mg PO Q12HR #20 cap 18  Unknown Rx


 


Benzonatate [Tessalon Perle] 100 mg PO TID PRN 7 Days #21 19  Unknown Rx





 capsule    


 


Ibuprofen [Motrin 600 MG tab] 600 mg PO Q8H PRN #30 tablet 19  Unknown Rx


 


Oseltamivir Phosphate [Tamiflu] 75 mg PO BID 5 Days #10 capsule 19  

Unknown Rx


 


traMADoL [Ultram 50 MG tab] 50 mg PO Q6HR PRN #12 tablet 19  Unknown Rx


 


Cyclobenzaprine [Flexeril] 10 mg PO TID PRN #30 tablet 19  Unknown Rx


 


Diclofenac Dr (Nf) 50 mg PO TID PRN #30 tablet.dr 19  Unknown Rx


 


Menthol/Camphor [Tiger Balm 1 applicatio TP TID PRN #1 tube 19  Unknown Rx





Ointment]     


 


predniSONE [Deltasone] 40 mg PO QDAY 5 Days #10 tab 19  Unknown Rx


 


Acetaminophen/Codeine [Tylenol 1 tab PO Q6H PRN #12 tab 21  Unknown Rx





/Codeine # 3 tab]     


 


Ibuprofen [Motrin] 800 mg PO Q8HR PRN #30 tablet 21  Unknown Rx


 


Sulfamethoxazole/Trimethoprim 1 each PO Q12H #20 tablet 21  Unknown Rx





[Bactrim DS TAB]     


 


traMADoL [Ultram 50 MG tab] 50 mg PO Q6HR PRN #10 tablet 21  Unknown Rx














ED Physical Exam





- General


Limitations: No Limitations





- Other


Other exam information: 





General: Awake, appropriately interactive, no acute distress. 


Neck: Supple. Full range of motion intact. 


Cardiovascular: Normal peripheral perfusion. 


Pulmonary: No respiratory distress. Patient is speaking normally without use of 

accessory muscles.


Skin: Abscess status post I&D to the medial aspect of the left gluteus with 

iodoform packing in place. 


Neurological: No facial asymmetry. Speech is clear. Follows commands. Patient is

alert and oriented. 


Musculoskeletal: Moves all four extremities spontaneously with normal range of 

motion. 


Psych: Cooperative. Appropriate mood and affect.





ED Course


                                   Vital Signs











  21





  00:36


 


Temperature 99.4 F


 


Pulse Rate 86


 


Respiratory 24





Rate 


 


Blood Pressure 149/95


 


O2 Sat by Pulse 97





Oximetry 














- Procedure Description


Procedures done: Verbal consent was obtained from the patient.  The site was 

identified.  The area was prepped and draped.  Hand hygiene was observed.  

Tegaderm dressing was removed.  Several layers of gauze with residual drainage 

were removed.  Packing was removed using forceps.  Clean dressing applied post 

procedure.  Patient tolerated well without complications.





ED Medical Decision Making





- Medical Decision Making


Patient presents to the emergency department for removal of packing 2 days 

status post I&D.  Packing removed without complications.  See procedure note for

details.  Patient has been compliant with her antibiotics but states her pain is

not adequately controlled.  She will be given one dose of pain medication prior 

to discharge home and prescribed short course of analgesics.  Emphasized the 

importance of following up with general surgeon for wound recheck and definitive

management of recurrent gluteal infections.  Patient expressed understanding and

is agreeable to plan of care.  Strict return precautions provided.





Repeat exam is unremarkable and benign. History, exam, diagnostic testing, and 

current condition do not suggest worrisome pathology to warrant further testing,

continued ED treatment, admission, or surgical evaluation at this point. Given 

the low probability of a significant medical illness, it would be more likely to

result in harm than benefit to perform further testing at this stage. Discussed 

findings, presumptive diagnosis, need for follow-up and specific signs/symptoms 

that should prompt immediate return to the emergency department. Instructions 

were explained in detail to the patient in addition to giving written discharge 

information. Patient expressed understanding and was given the opportunity to 

ask questions, all of which were satisfactorily answered prior to discharge 

home.


Critical care attestation.: 


If time is entered above; I have spent that time in minutes in the direct care 

of this critically ill patient, excluding procedure time.








ED Disposition


Clinical Impression: 


 Abscess and cellulitis of gluteal region





Disposition:  HOME / SELF CARE / HOMELESS


Is pt being admited?: No


Does the pt Need Aspirin: No


Condition: Stable


Instructions:  Incision and Drainage, Care After


Additional Instructions: 


Continue antibiotics as previously prescribed.


Continue Ibuprofen as previously prescribed.


Discontinue Tylenol #3. 


Take Tramadol with food as directed for pain. 


Do not drive or operate machinery while taking this medication.


Do not consume alcohol while taking this medication.


Keep wound clean and covered.


Change dressing daily.


Follow-up with Dr. Zamora, general surgeon, this week.  Call tomorrow to 

schedule an appointment.  See referral information below.


Return to the emergency department immediately for new or worsening symptoms


Prescriptions: 


traMADoL [Ultram 50 MG tab] 50 mg PO Q6HR PRN #10 tablet


 PRN Reason: Pain


Referrals: 


SONAM ZAMORA DO [Staff Physician] - 3-5 Days


Time of Disposition: 01:06

## 2022-01-20 ENCOUNTER — HOSPITAL ENCOUNTER (EMERGENCY)
Dept: HOSPITAL 5 - ED | Age: 32
Discharge: HOME | End: 2022-01-20
Payer: COMMERCIAL

## 2022-01-20 VITALS — SYSTOLIC BLOOD PRESSURE: 145 MMHG | DIASTOLIC BLOOD PRESSURE: 99 MMHG

## 2022-01-20 DIAGNOSIS — R56.9: ICD-10-CM

## 2022-01-20 DIAGNOSIS — Z98.890: ICD-10-CM

## 2022-01-20 DIAGNOSIS — I10: ICD-10-CM

## 2022-01-20 DIAGNOSIS — N20.0: ICD-10-CM

## 2022-01-20 DIAGNOSIS — L02.31: Primary | ICD-10-CM

## 2022-01-20 DIAGNOSIS — Z79.899: ICD-10-CM

## 2022-01-20 PROCEDURE — 99282 EMERGENCY DEPT VISIT SF MDM: CPT

## 2022-01-20 PROCEDURE — 10060 I&D ABSCESS SIMPLE/SINGLE: CPT

## 2022-01-20 NOTE — EMERGENCY DEPARTMENT REPORT
Abscess Boil HPI





- HPI


Chief Complaint: Skin/Abscess/Foreign Body


Stated Complaint: PILONIDAL CYST


Duration: 2 Days


Location: Other


Severity: Moderate


History: Yes Fever, Yes Pain, Yes Previous History, No Purulent Drainage, No 

Numbness, No Foreign Body, No Insect Bite


HPI: 31 female presents to the ED with left gluteal pain x2-day.  Patient has 

previous history of abscess.  SHe has history of previous incision and drainage 

to the sacral area. Patient state that she shave her buttock 3 days earlier .  

Patient has a mild edema with palpable tender mass noted to the left gluteal 

area with fluctuance noted.


Home Medications: 


                                  Previous Rx's











 Medication  Instructions  Recorded  Last Taken  Type


 


cephALEXin [Keflex] 500 mg PO Q12HR #20 cap 18 Unknown Rx


 


Benzonatate [Tessalon Perle] 100 mg PO TID PRN 7 Days #21 19 Unknown Rx





 capsule   


 


Ibuprofen [Motrin 600 MG tab] 600 mg PO Q8H PRN #30 tablet 19 Unknown Rx


 


Oseltamivir Phosphate [Tamiflu] 75 mg PO BID 5 Days #10 capsule 19 Unknown

 Rx


 


traMADoL [Ultram 50 MG tab] 50 mg PO Q6HR PRN #12 tablet 19 Unknown Rx


 


Cyclobenzaprine [Flexeril] 10 mg PO TID PRN #30 tablet 19 Unknown Rx


 


Diclofenac Dr (Nf) 50 mg PO TID PRN #30 tablet.dr 19 Unknown Rx


 


Menthol/Camphor [Tiger Balm 1 applicatio TP TID PRN #1 tube 19 Unknown Rx





Ointment]    


 


predniSONE [Deltasone] 40 mg PO QDAY 5 Days #10 tab 19 Unknown Rx


 


Acetaminophen/Codeine [Tylenol 1 tab PO Q6H PRN #12 tab 21 Unknown Rx





/Codeine # 3 tab]    


 


Ibuprofen [Motrin] 800 mg PO Q8HR PRN #30 tablet 21 Unknown Rx


 


Sulfamethoxazole/Trimethoprim 1 each PO Q12H #20 tablet 21 Unknown Rx





[Bactrim DS TAB]    


 


traMADoL [Ultram 50 MG tab] 50 mg PO Q6HR PRN #10 tablet 21 Unknown Rx


 


Clindamycin [Clindamycin CAP] 300 mg PO Q8H 7 Days #21 cap 22 Unknown Rx











Allergies/Adverse Reactions: 


                                    Allergies











Allergy/AdvReac Type Severity Reaction Status Date / Time


 


No Known Allergies Allergy   Verified 21 00:36














ED Review of Systems


ROS: 


Stated complaint: PILONIDAL CYST


Other details as noted in HPI





Constitutional: denies: chills, fever


Eyes: denies: eye pain, eye discharge, vision change


ENT: denies: ear pain, throat pain


Respiratory: denies: cough, shortness of breath, wheezing


Cardiovascular: denies: chest pain, palpitations


Endocrine: no symptoms reported


Gastrointestinal: denies: abdominal pain, nausea, diarrhea


Genitourinary: denies: urgency, dysuria, discharge


Musculoskeletal: denies: back pain, joint swelling, arthralgia


Skin: rash (Abscess), other.  denies: lesions


Neurological: denies: headache, weakness, paresthesias


Psychiatric: denies: anxiety, depression


Hematological/Lymphatic: denies: easy bleeding, easy bruising





ED Past Medical Hx





- Past Medical History


Hx Hypertension: Yes


Hx Congestive Heart Failure: No


Hx Diabetes: No


Hx Deep Vein Thrombosis: No


Hx Renal Disease: Yes (kidney stones)


Hx Seizures: Yes (childhood last age 21)


Hx Asthma: No


Hx COPD: No


Additional medical history: History of cholelithiasis, hyperparathyroidism





- Surgical History


Additional Surgical History:  2007, T&A,thyroid





- Social History


Smoking Status: Never Smoker


Substance Use Type: None





- Medications


Home Medications: 


                                Home Medications











 Medication  Instructions  Recorded  Confirmed  Last Taken  Type


 


cephALEXin [Keflex] 500 mg PO Q12HR #20 cap 18  Unknown Rx


 


Benzonatate [Tessalon Perle] 100 mg PO TID PRN 7 Days #21 19  Unknown Rx





 capsule    


 


Ibuprofen [Motrin 600 MG tab] 600 mg PO Q8H PRN #30 tablet 19  Unknown Rx


 


Oseltamivir Phosphate [Tamiflu] 75 mg PO BID 5 Days #10 capsule 19  

Unknown Rx


 


traMADoL [Ultram 50 MG tab] 50 mg PO Q6HR PRN #12 tablet 19  Unknown Rx


 


Cyclobenzaprine [Flexeril] 10 mg PO TID PRN #30 tablet 19  Unknown Rx


 


Diclofenac Dr (Nf) 50 mg PO TID PRN #30 tablet. 19  Unknown Rx


 


Menthol/Camphor [Tiger Balm 1 applicatio TP TID PRN #1 tube 19  Unknown Rx





Ointment]     


 


predniSONE [Deltasone] 40 mg PO QDAY 5 Days #10 tab 19  Unknown Rx


 


Acetaminophen/Codeine [Tylenol 1 tab PO Q6H PRN #12 tab 21  Unknown Rx





/Codeine # 3 tab]     


 


Ibuprofen [Motrin] 800 mg PO Q8HR PRN #30 tablet 21  Unknown Rx


 


Sulfamethoxazole/Trimethoprim 1 each PO Q12H #20 tablet 21  Unknown Rx





[Bactrim DS TAB]     


 


traMADoL [Ultram 50 MG tab] 50 mg PO Q6HR PRN #10 tablet 21  Unknown Rx


 


Clindamycin [Clindamycin CAP] 300 mg PO Q8H 7 Days #21 cap 22  Unknown Rx














ED Abscess Boil Physical Exam





- Exam


General: 


Vital signs noted. No distress. Alert and acting appropriately.


Erythema  tender mass noted to the left gluteal area with fluctuant noted to the

left gluteal


Size: 4 cm


Exam: Yes Tenderness, Yes Fluctuance, Yes Surrounding Cellulites/Erythema, Yes 

Normal Neurologic Exam, Yes Normal Circulation, No Lymphangitis, No Crepitation,

No Heart Murmur


Exam: General: Awake, appropriately interactive, no acute distress.  Neck: 

Supple. Full range of motion intact.  Cardiovascular: Normal peripheral 

perfusion.  Pulmonary: No respiratory distress. Patient is speaking normally 

without use of accessory muscles.  Skin: No apparent rashes or lesions.  N

eurological: No facial asymmetry. Speech is clear. Follows commands. Patient is 

alert and oriented.  Musculoskeletal: Full range of motion, no crepitus.  No 

tenderness to palpate nonerythematous no edema test appreciated.  Able to bear 

weight and ambulate without difficulty.  Distal neurovascular and motor/sensory 

function is intact.  Psych: Cooperative. Appropriate mood and affect.





I & D Note





- I & D Note


I & D Note: The left gluteal area was prepared and draped.  5 cc of lidocaine 

was used to anesthetizd.  linear  incision along the local skin lines was made 

with 11 blade and copious amount of purulent drainage expressed.  The abscess 

was explored thoroughly and sequestered fluid pocket was opened.  Bleeding was 

minimal.  The patient tolerated the procedure well without complication





ED Course


                                   Vital Signs











  22





  17:01


 


Temperature 98.9 F


 


Pulse Rate 99 H


 


Respiratory 18





Rate 


 


Blood Pressure 136/94


 


O2 Sat by Pulse 100





Oximetry 











Critical care attestation.: 


If time is entered above; I have spent that time in minutes in the direct care 

of this critically ill patient, excluding procedure time.








ED Medical Decision Making





- Medical Decision Making


31 female presents to the ED with left gluteal pain x2-day.  Patient has 

previous history of abscess.  SHe has history of previous incision and drainage 

to the sacral area. Patient state that she shave her buttock 3 days earlier .  

Patient has a mild edema with palpable tender mass noted to the left gluteal 

area with fluctuance noted.


Incision and drainage procedure .  Patient tolerated well.


She is to follow-up with PCP in 48 hours.


I have spoken with the patient and/or caregiver.  I have explained the patient 

condition, diagnosis and treatment plan based on information available to me at 

this time.  I have answered the patient and/caregiver questions and addressed 

any concerns.  The patient and/or caregiver have has good an understanding of 

the patient diagnosed condition and treatment plan as can be expected at this 

point.  The vital signs have been stable.  The patient condition is stable and 

appropriate for discharge from the emergency department.  The patient and 

caregiver has been given detailed instruction in a written format and expressed 

understanding of discharge instruction.  The patient/or caregiver are aware that

any significant change in condition or worsening of symptoms should prompt an 

immediate return to the this or the closest emergency department or call to 911











ED Disposition


Clinical Impression: 


 Abscess





Disposition: 01 HOME / SELF CARE / HOMELESS


Is pt being admited?: No


Does the pt Need Aspirin: No


Condition: Stable


Instructions:  Skin Abscess, Easy-to-Read


Additional Instructions: 


Medication has needed


Return to ED for worsening symptoms


Follow-up with primary care doctor as needed


Prescriptions: 


Clindamycin [Clindamycin CAP] 300 mg PO Q8H 7 Days #21 cap


Referrals: 


PRIMARY CARE,MD [Primary Care Provider] - 3-5 Days


TRACEY SILVER MD [Staff Physician] - 3-5 Days


Forms:  Work/School Release Form(ED)

## 2023-01-27 ENCOUNTER — WEB ENCOUNTER (OUTPATIENT)
Dept: URBAN - METROPOLITAN AREA CLINIC 52 | Facility: CLINIC | Age: 33
End: 2023-01-27

## 2023-01-27 ENCOUNTER — OFFICE VISIT (OUTPATIENT)
Dept: URBAN - METROPOLITAN AREA CLINIC 52 | Facility: CLINIC | Age: 33
End: 2023-01-27
Payer: COMMERCIAL

## 2023-01-27 ENCOUNTER — LAB OUTSIDE AN ENCOUNTER (OUTPATIENT)
Dept: URBAN - METROPOLITAN AREA CLINIC 52 | Facility: CLINIC | Age: 33
End: 2023-01-27

## 2023-01-27 ENCOUNTER — DASHBOARD ENCOUNTERS (OUTPATIENT)
Age: 33
End: 2023-01-27

## 2023-01-27 VITALS
SYSTOLIC BLOOD PRESSURE: 126 MMHG | HEART RATE: 88 BPM | BODY MASS INDEX: 34.31 KG/M2 | WEIGHT: 201 LBS | DIASTOLIC BLOOD PRESSURE: 88 MMHG | TEMPERATURE: 98.1 F | HEIGHT: 64 IN

## 2023-01-27 DIAGNOSIS — Z79.1 NSAID LONG-TERM USE: ICD-10-CM

## 2023-01-27 DIAGNOSIS — R10.13 EPIGASTRIC ABDOMINAL PAIN: ICD-10-CM

## 2023-01-27 DIAGNOSIS — R14.0 ABDOMINAL BLOATING: ICD-10-CM

## 2023-01-27 DIAGNOSIS — R11.2 NAUSEA AND VOMITING, UNSPECIFIED VOMITING TYPE: ICD-10-CM

## 2023-01-27 DIAGNOSIS — R19.7 DIARRHEA, UNSPECIFIED TYPE: ICD-10-CM

## 2023-01-27 DIAGNOSIS — R10.30 LOWER ABDOMINAL PAIN: ICD-10-CM

## 2023-01-27 PROCEDURE — 99204 OFFICE O/P NEW MOD 45 MIN: CPT | Performed by: PHYSICIAN ASSISTANT

## 2023-01-27 RX ORDER — DICYCLOMINE HYDROCHLORIDE 20 MG/1
1 TABLET TABLET ORAL
Qty: 90 | Refills: 1 | OUTPATIENT
Start: 2023-01-27 | End: 2023-03-28

## 2023-01-27 RX ORDER — PANTOPRAZOLE SODIUM 20 MG/1
1 TABLET TABLET, DELAYED RELEASE ORAL TWICE A DAY
Qty: 60 TABLET | Refills: 1 | OUTPATIENT
Start: 2023-01-27

## 2023-01-27 RX ORDER — PROMETHAZINE HYDROCHLORIDE 12.5 MG/1
1 TABLET AS NEEDED TABLET ORAL
Qty: 40 | Refills: 1 | OUTPATIENT
Start: 2023-01-27 | End: 2023-02-16

## 2023-01-27 RX ORDER — SUCRALFATE 1 G/1
1 TABLET ON AN EMPTY STOMACH TABLET ORAL TWICE A DAY
Qty: 60 | Refills: 1 | OUTPATIENT
Start: 2023-01-27 | End: 2023-03-28

## 2023-01-27 NOTE — HPI-TODAY'S VISIT:
This is a 32 y.o. female with h/o hypothyroidism who presents to office c/o crampy epigastric and lower abdominal pain, nausea, vomiting and diarrhea. Symptoms have lasted for 4 days, but patient states this is not the first time she has had similar symptoms. She does report using Goody's Powder every 6 hours for several months for joint pain. She also reports using Meloxicam. She reports diarrhea 30 minutes to 1 hour after eating. Diarrhea is sometimes associated with vomiting. Symptoms are independent of food.  She denies black or bloody stool, denies black or bloody vomit. She denies new foods. Denies sick contacts. Denies recent antibiotics. Currently, Zofran ODT is not working for nausea or vomiting.

## 2023-01-27 NOTE — PHYSICAL EXAM GASTROINTESTINAL
Abdomen , soft, nondistended, diffuse tenderness on palpation, no guarding or rigidity , no masses palpable , normal bowel sounds , Liver and Spleen,  no hepatosplenomegaly , liver nontender

## 2023-02-09 ENCOUNTER — OFFICE VISIT (OUTPATIENT)
Dept: URBAN - METROPOLITAN AREA SURGERY CENTER 17 | Facility: SURGERY CENTER | Age: 33
End: 2023-02-09

## 2023-02-10 ENCOUNTER — OFFICE VISIT (OUTPATIENT)
Dept: URBAN - METROPOLITAN AREA SURGERY CENTER 17 | Facility: SURGERY CENTER | Age: 33
End: 2023-02-10
Payer: COMMERCIAL

## 2023-02-10 ENCOUNTER — CLAIMS CREATED FROM THE CLAIM WINDOW (OUTPATIENT)
Dept: URBAN - METROPOLITAN AREA CLINIC 4 | Facility: CLINIC | Age: 33
End: 2023-02-10
Payer: COMMERCIAL

## 2023-02-10 DIAGNOSIS — K29.70 GASTRITIS, UNSPECIFIED, WITHOUT BLEEDING: ICD-10-CM

## 2023-02-10 DIAGNOSIS — R11.2 ACUTE NAUSEA WITH NONBILIOUS VOMITING: ICD-10-CM

## 2023-02-10 DIAGNOSIS — K29.60 ADENOPAPILLOMATOSIS GASTRICA: ICD-10-CM

## 2023-02-10 DIAGNOSIS — R10.13 ABDOMINAL DISCOMFORT, EPIGASTRIC: ICD-10-CM

## 2023-02-10 DIAGNOSIS — K31.89 OTHER DISEASES OF STOMACH AND DUODENUM: ICD-10-CM

## 2023-02-10 PROCEDURE — 43239 EGD BIOPSY SINGLE/MULTIPLE: CPT | Performed by: INTERNAL MEDICINE

## 2023-02-10 PROCEDURE — 88342 IMHCHEM/IMCYTCHM 1ST ANTB: CPT | Performed by: PATHOLOGY

## 2023-02-10 PROCEDURE — 88341 IMHCHEM/IMCYTCHM EA ADD ANTB: CPT | Performed by: PATHOLOGY

## 2023-02-10 PROCEDURE — G8907 PT DOC NO EVENTS ON DISCHARG: HCPCS | Performed by: INTERNAL MEDICINE

## 2023-02-10 PROCEDURE — 88305 TISSUE EXAM BY PATHOLOGIST: CPT | Performed by: PATHOLOGY

## 2023-02-10 RX ORDER — PANTOPRAZOLE SODIUM 20 MG/1
1 TABLET TABLET, DELAYED RELEASE ORAL TWICE A DAY
Qty: 60 TABLET | Refills: 1 | Status: ACTIVE | COMMUNITY
Start: 2023-01-27

## 2023-02-10 RX ORDER — PROMETHAZINE HYDROCHLORIDE 12.5 MG/1
1 TABLET AS NEEDED TABLET ORAL
Qty: 40 | Refills: 1 | Status: ACTIVE | COMMUNITY
Start: 2023-01-27 | End: 2023-02-16

## 2023-02-10 RX ORDER — DICYCLOMINE HYDROCHLORIDE 20 MG/1
1 TABLET TABLET ORAL
Qty: 90 | Refills: 1 | Status: ACTIVE | COMMUNITY
Start: 2023-01-27 | End: 2023-03-28

## 2023-02-10 RX ORDER — SUCRALFATE 1 G/1
1 TABLET ON AN EMPTY STOMACH TABLET ORAL TWICE A DAY
Qty: 60 | Refills: 1 | Status: ACTIVE | COMMUNITY
Start: 2023-01-27 | End: 2023-03-28